# Patient Record
Sex: FEMALE | Race: OTHER | HISPANIC OR LATINO
[De-identification: names, ages, dates, MRNs, and addresses within clinical notes are randomized per-mention and may not be internally consistent; named-entity substitution may affect disease eponyms.]

---

## 2017-04-11 ENCOUNTER — RESULT REVIEW (OUTPATIENT)
Age: 52
End: 2017-04-11

## 2017-04-11 ENCOUNTER — LABORATORY RESULT (OUTPATIENT)
Age: 52
End: 2017-04-11

## 2017-04-12 ENCOUNTER — APPOINTMENT (OUTPATIENT)
Dept: OBGYN | Facility: CLINIC | Age: 52
End: 2017-04-12

## 2017-04-12 VITALS
SYSTOLIC BLOOD PRESSURE: 120 MMHG | WEIGHT: 174 LBS | DIASTOLIC BLOOD PRESSURE: 80 MMHG | HEIGHT: 63 IN | BODY MASS INDEX: 30.83 KG/M2

## 2017-04-12 DIAGNOSIS — N76.2 ACUTE VULVITIS: ICD-10-CM

## 2018-04-24 ENCOUNTER — LABORATORY RESULT (OUTPATIENT)
Age: 53
End: 2018-04-24

## 2018-04-25 ENCOUNTER — APPOINTMENT (OUTPATIENT)
Dept: OBGYN | Facility: CLINIC | Age: 53
End: 2018-04-25
Payer: MEDICAID

## 2018-04-25 VITALS
WEIGHT: 173 LBS | DIASTOLIC BLOOD PRESSURE: 80 MMHG | BODY MASS INDEX: 30.65 KG/M2 | HEIGHT: 63 IN | SYSTOLIC BLOOD PRESSURE: 120 MMHG

## 2018-04-25 PROCEDURE — 99396 PREV VISIT EST AGE 40-64: CPT

## 2019-05-13 ENCOUNTER — APPOINTMENT (OUTPATIENT)
Dept: OBGYN | Facility: CLINIC | Age: 54
End: 2019-05-13
Payer: MEDICAID

## 2019-05-13 VITALS — BODY MASS INDEX: 31.89 KG/M2 | WEIGHT: 180 LBS | DIASTOLIC BLOOD PRESSURE: 70 MMHG | SYSTOLIC BLOOD PRESSURE: 120 MMHG

## 2019-05-13 PROCEDURE — 99396 PREV VISIT EST AGE 40-64: CPT

## 2019-05-13 NOTE — COUNSELING
[Breast Self Exam] : breast self exam [Nutrition] : nutrition [Exercise] : exercise [Vitamins/Supplements] : vitamins/supplements [Vaccines] : vaccines [STD (testing, results, tx)] : STD (testing, results, tx) [Weight Management] : weight management

## 2019-05-13 NOTE — PHYSICAL EXAM
[Awake] : awake [Alert] : alert [Soft] : soft [Oriented x3] : oriented to person, place, and time [Normal] : vagina [No Bleeding] : there was no active vaginal bleeding [Absent] : absent [Uterine Adnexae] : were not tender and not enlarged [Acute Distress] : no acute distress [Mass] : no breast mass [Nipple Discharge] : no nipple discharge [Axillary LAD] : no axillary lymphadenopathy [Tender] : non tender

## 2019-11-06 ENCOUNTER — APPOINTMENT (OUTPATIENT)
Dept: OBGYN | Facility: CLINIC | Age: 54
End: 2019-11-06
Payer: MEDICAID

## 2019-11-06 VITALS — DIASTOLIC BLOOD PRESSURE: 84 MMHG | WEIGHT: 190 LBS | BODY MASS INDEX: 33.66 KG/M2 | SYSTOLIC BLOOD PRESSURE: 124 MMHG

## 2019-11-06 PROCEDURE — 99212 OFFICE O/P EST SF 10 MIN: CPT

## 2020-08-30 ENCOUNTER — LABORATORY RESULT (OUTPATIENT)
Age: 55
End: 2020-08-30

## 2020-08-31 ENCOUNTER — LABORATORY RESULT (OUTPATIENT)
Age: 55
End: 2020-08-31

## 2020-08-31 ENCOUNTER — APPOINTMENT (OUTPATIENT)
Dept: OBGYN | Facility: CLINIC | Age: 55
End: 2020-08-31
Payer: MEDICAID

## 2020-08-31 VITALS — DIASTOLIC BLOOD PRESSURE: 100 MMHG | SYSTOLIC BLOOD PRESSURE: 140 MMHG | WEIGHT: 178 LBS | BODY MASS INDEX: 31.53 KG/M2

## 2020-08-31 PROCEDURE — 99396 PREV VISIT EST AGE 40-64: CPT

## 2020-09-23 ENCOUNTER — APPOINTMENT (OUTPATIENT)
Dept: OBGYN | Facility: CLINIC | Age: 55
End: 2020-09-23
Payer: MEDICAID

## 2020-09-23 PROCEDURE — 51784 ANAL/URINARY MUSCLE STUDY: CPT

## 2020-09-23 PROCEDURE — 51741 ELECTRO-UROFLOWMETRY FIRST: CPT

## 2020-09-23 PROCEDURE — 51729 CYSTOMETROGRAM W/VP&UP: CPT

## 2020-10-08 ENCOUNTER — LABORATORY RESULT (OUTPATIENT)
Age: 55
End: 2020-10-08

## 2020-10-08 ENCOUNTER — APPOINTMENT (OUTPATIENT)
Dept: OBGYN | Facility: CLINIC | Age: 55
End: 2020-10-08
Payer: MEDICAID

## 2020-10-08 VITALS — BODY MASS INDEX: 30.29 KG/M2 | DIASTOLIC BLOOD PRESSURE: 72 MMHG | SYSTOLIC BLOOD PRESSURE: 136 MMHG | WEIGHT: 171 LBS

## 2020-10-08 DIAGNOSIS — A74.9 CHLAMYDIAL INFECTION, UNSPECIFIED: ICD-10-CM

## 2020-10-08 DIAGNOSIS — N39.46 MIXED INCONTINENCE: ICD-10-CM

## 2020-10-08 PROCEDURE — 99213 OFFICE O/P EST LOW 20 MIN: CPT

## 2020-10-08 RX ORDER — TOLTERODINE TARTRATE 4 MG/1
4 CAPSULE, EXTENDED RELEASE ORAL
Qty: 1 | Refills: 6 | Status: ACTIVE | COMMUNITY
Start: 2020-10-08 | End: 1900-01-01

## 2020-10-08 NOTE — PHYSICAL EXAM
[Labia Majora] : normal [Labia Minora] : normal [Normal] : normal [Absent] : absent [Uterine Adnexae] : normal

## 2020-10-08 NOTE — COUNSELING
[Nutrition/ Exercise/ Weight Management] : nutrition, exercise, weight management [Body Image] : body image [Vitamins/Supplements] : vitamins/supplements [Contraception/ Emergency Contraception/ Safe Sexual Practices] : contraception, emergency contraception, safe sexual practices [Preconception Care/ Fertility options] : preconception care, fertility options [STD (testing, results, tx)] : STD (testing, results, tx) [Influenza Vaccine] : influenza vaccine

## 2021-08-31 ENCOUNTER — LABORATORY RESULT (OUTPATIENT)
Age: 56
End: 2021-08-31

## 2021-09-01 ENCOUNTER — APPOINTMENT (OUTPATIENT)
Dept: OBGYN | Facility: CLINIC | Age: 56
End: 2021-09-01
Payer: MEDICAID

## 2021-09-01 VITALS — SYSTOLIC BLOOD PRESSURE: 128 MMHG | DIASTOLIC BLOOD PRESSURE: 82 MMHG | BODY MASS INDEX: 31.53 KG/M2 | WEIGHT: 178 LBS

## 2021-09-01 PROCEDURE — 99396 PREV VISIT EST AGE 40-64: CPT

## 2021-09-01 RX ORDER — CLOTRIMAZOLE AND BETAMETHASONE DIPROPIONATE 10; .5 MG/G; MG/G
1-0.05 CREAM TOPICAL TWICE DAILY
Qty: 1 | Refills: 0 | Status: COMPLETED | COMMUNITY
Start: 2017-04-12 | End: 2021-09-01

## 2021-09-01 RX ORDER — AZITHROMYCIN 1 G/1
1 POWDER, FOR SUSPENSION ORAL
Qty: 1 | Refills: 1 | Status: COMPLETED | COMMUNITY
Start: 2020-09-04 | End: 2021-09-01

## 2021-09-01 NOTE — HISTORY OF PRESENT ILLNESS
[FreeTextEntry1] : Patient is a 56 year old female who presents for her annual exam.  Reports no vaginal bleeding, no abdominal or pelvic pain, change in discharge, change in bowel or bladder habits or any other concerns.  Due for pap and mammo.

## 2021-09-01 NOTE — PHYSICAL EXAM
[Appropriately responsive] : appropriately responsive [Alert] : alert [No Acute Distress] : no acute distress [No Lymphadenopathy] : no lymphadenopathy [Regular Rate Rhythm] : regular rate rhythm [No Murmurs] : no murmurs [Clear to Auscultation B/L] : clear to auscultation bilaterally [Soft] : soft [Non-tender] : non-tender [Non-distended] : non-distended [No HSM] : No HSM [No Lesions] : no lesions [No Mass] : no mass [Oriented x3] : oriented x3 [Examination Of The Breasts] : a normal appearance [No Masses] : no breast masses were palpable [Labia Minora] : normal [Labia Majora] : normal [Normal] : normal [Absent] : absent [Uterine Adnexae] : normal

## 2022-09-07 ENCOUNTER — RESULT REVIEW (OUTPATIENT)
Age: 57
End: 2022-09-07

## 2022-09-21 ENCOUNTER — APPOINTMENT (OUTPATIENT)
Dept: OBGYN | Facility: CLINIC | Age: 57
End: 2022-09-21

## 2022-09-21 VITALS
WEIGHT: 161 LBS | HEART RATE: 65 BPM | OXYGEN SATURATION: 100 % | SYSTOLIC BLOOD PRESSURE: 125 MMHG | DIASTOLIC BLOOD PRESSURE: 83 MMHG | BODY MASS INDEX: 28.52 KG/M2

## 2022-09-21 PROCEDURE — 99396 PREV VISIT EST AGE 40-64: CPT

## 2022-09-21 NOTE — HISTORY OF PRESENT ILLNESS
[FreeTextEntry1] : Patient is a 57 year old female who presents for her annual exam.  Reports no vaginal bleeding, no abdominal or pelvic pain, change in discharge, change in bowel or bladder habits or any other concerns.  Due for pap and mammo scheduled for december.  Not sexually active.

## 2022-10-05 LAB
C TRACH RRNA SPEC QL NAA+PROBE: NOT DETECTED
CYTOLOGY CVX/VAG DOC THIN PREP: ABNORMAL
N GONORRHOEA RRNA SPEC QL NAA+PROBE: NOT DETECTED
SOURCE TP AMPLIFICATION: NORMAL

## 2023-01-25 ENCOUNTER — APPOINTMENT (OUTPATIENT)
Dept: OBGYN | Facility: CLINIC | Age: 58
End: 2023-01-25
Payer: MEDICAID

## 2023-01-25 VITALS
WEIGHT: 174 LBS | DIASTOLIC BLOOD PRESSURE: 79 MMHG | BODY MASS INDEX: 30.82 KG/M2 | OXYGEN SATURATION: 98 % | SYSTOLIC BLOOD PRESSURE: 134 MMHG | HEART RATE: 64 BPM

## 2023-01-25 DIAGNOSIS — B00.9 HERPESVIRAL INFECTION, UNSPECIFIED: ICD-10-CM

## 2023-01-25 PROCEDURE — 99212 OFFICE O/P EST SF 10 MIN: CPT

## 2023-01-25 RX ORDER — VALACYCLOVIR 500 MG/1
500 TABLET, FILM COATED ORAL TWICE DAILY
Qty: 10 | Refills: 2 | Status: ACTIVE | COMMUNITY
Start: 2023-01-25 | End: 1900-01-01

## 2023-01-25 NOTE — PHYSICAL EXAM
[Appropriately responsive] : appropriately responsive [Alert] : alert [No Acute Distress] : no acute distress [No Lymphadenopathy] : no lymphadenopathy [Regular Rate Rhythm] : regular rate rhythm [No Murmurs] : no murmurs [Clear to Auscultation B/L] : clear to auscultation bilaterally [Soft] : soft [Non-tender] : non-tender [Non-distended] : non-distended [No HSM] : No HSM [No Lesions] : no lesions [No Mass] : no mass [Oriented x3] : oriented x3 [Labia Majora] : normal [Labia Minora] : normal [Normal] : normal [Uterine Adnexae] : normal [FreeTextEntry2] : + scar from prev herpetic outbreak

## 2023-01-25 NOTE — HISTORY OF PRESENT ILLNESS
[FreeTextEntry1] : Patient is a 57 year old female who presents for follow up .  States that she traveled to her country over the [place] and had a painful lesion on her right labia.  Was told that it was herpes and given medication.  No current pain, discharge, bleeding, or any other concerns.   States that she wants to make sure that there are no current lesions.  + scar noted where previous lesion was, non painful, not indurated. Up to date on pap.

## 2023-09-27 ENCOUNTER — APPOINTMENT (OUTPATIENT)
Dept: OBGYN | Facility: CLINIC | Age: 58
End: 2023-09-27
Payer: MEDICAID

## 2023-09-27 VITALS
WEIGHT: 180 LBS | DIASTOLIC BLOOD PRESSURE: 83 MMHG | OXYGEN SATURATION: 99 % | SYSTOLIC BLOOD PRESSURE: 129 MMHG | HEART RATE: 72 BPM | BODY MASS INDEX: 31.89 KG/M2

## 2023-09-27 DIAGNOSIS — Z01.419 ENCOUNTER FOR GYNECOLOGICAL EXAMINATION (GENERAL) (ROUTINE) W/OUT ABNORMAL FINDINGS: ICD-10-CM

## 2023-09-27 PROCEDURE — 99396 PREV VISIT EST AGE 40-64: CPT

## 2023-09-28 LAB
C TRACH RRNA SPEC QL NAA+PROBE: NOT DETECTED
N GONORRHOEA RRNA SPEC QL NAA+PROBE: NOT DETECTED
SOURCE TP AMPLIFICATION: NORMAL

## 2023-10-04 LAB — CYTOLOGY CVX/VAG DOC THIN PREP: ABNORMAL

## 2024-06-01 NOTE — PHYSICAL EXAM
"Subjective     History provided by:  Patient and medical records    History of Present Illness    Chief complaint: Nausea and vomiting    Location: Upper GI tract    Quality/Severity: Patient says severe nausea has vomited numerous times.  Not tolerating p.o.    Timing/Onset: Symptoms started last night.    Modifying Factors: Patient has a history of diabetes with ketoacidosis, history of GERD and history of Howell's esophagus.  She also has a history of polysubstance abuse.    Associated symptoms: Denies a fever.  States her abdomen only hurts when she vomits.  States she has had congestion and a nonproductive cough for 4 to 5 days.    Narrative: The patient is a 58-year-old white female who states she developed congestion and a cough about 4 to 5 days ago.  Last night she developed severe nausea and has vomited numerous times.  The patient has been hospitalized here before for ketoacidosis.  She is not tolerating p.o.  She states that her abdomen only hurts when she vomits.    Review of Systems  Past Medical History:   Diagnosis Date    Abnormal Pap smear of cervix     1 abnormal pap with normal f/u    Howell esophagus     Colon polyp     Diabetes mellitus     Difficulty swallowing     at times    Fibroid     GERD (gastroesophageal reflux disease)     HSV-2 (herpes simplex virus 2) infection 7/2/2017    Menstrual disorder     Migraines     Rectal bleeding     bleeds with bowel movement    Seasonal allergies      /65   Pulse (!) 121   Temp 97.1 °F (36.2 °C) (Rectal) Comment: Changed Gaymar to monitor mode at this time  Resp 26   Ht 160 cm (63\")   Wt 56.5 kg (124 lb 9 oz)   LMP  (LMP Unknown)   SpO2 98%   BMI 22.06 kg/m²     Past Medical History:   Diagnosis Date    Abnormal Pap smear of cervix     1 abnormal pap with normal f/u    Howell esophagus     Colon polyp     Diabetes mellitus     Difficulty swallowing     at times    Fibroid     GERD (gastroesophageal reflux disease)     HSV-2 (herpes " simplex virus 2) infection 2017    Menstrual disorder     Migraines     Rectal bleeding     bleeds with bowel movement    Seasonal allergies        Allergies   Allergen Reactions    Mobic [Meloxicam]      Blurred vision       Past Surgical History:   Procedure Laterality Date    CARPAL TUNNEL RELEASE WITH CUBITAL TUNNEL RELEASE Right      SECTION      X2    COLONOSCOPY N/A 2016    Procedure: COLONOSCOPY with polypectomy;  Surgeon: Maryan Kent MD;  Location: [facility] OR;  Service:     COLONOSCOPY N/A 6/3/2020    Procedure: COLONOSCOPY;  Surgeon: Dmitri Fung MD;  Location: [facility] OR;  Service: Gastroenterology;  Laterality: N/A;  Poor prep, polyp    ENDOMETRIAL ABLATION      thermachoice    ENDOSCOPY N/A 6/3/2020    Procedure: ESOPHAGOGASTRODUODENOSCOPY;  Surgeon: Dmitri Fung MD;  Location: [facility] OR;  Service: Gastroenterology;  Laterality: N/A;  Reflux esophagitis, erosive gastritis, duodenitis    HYSTEROSCOPY      AND ENDOMETRIAL  ABLATION    NASAL SEPTUM SURGERY          TUBAL ABDOMINAL LIGATION         Family History   Problem Relation Age of Onset    Heart attack Father     Hypertension Father     Diabetes Father     Alcohol abuse Father     Dementia Father     Hypertension Mother     Hyperlipidemia Mother     Atrial fibrillation Mother     Stroke Mother     Heart attack Brother     Hypertension Brother     Stroke Brother     Esophageal cancer Brother     Stroke Sister     Crohn's disease Sister     Alzheimer's disease Paternal Grandmother     Colon cancer Maternal Uncle     Diabetes Paternal Aunt     Breast cancer Paternal Aunt     Rheum arthritis Other     Ovarian cancer Neg Hx     Deep vein thrombosis Neg Hx     Pulmonary embolism Neg Hx     Uterine cancer Neg Hx        Social History     Socioeconomic History    Marital status:    Tobacco Use    Smoking status: Former     Current packs/day: 0.00     Types: Cigarettes     Quit date: 1995  "    Years since quittin.0    Smokeless tobacco: Never    Tobacco comments:     Quit \" a long time ago\"    Vaping Use    Vaping status: Never Used   Substance and Sexual Activity    Alcohol use: Yes     Comment: OCCASIONALLY    Drug use: Yes     Types: Marijuana    Sexual activity: Yes     Partners: Male     Birth control/protection: Post-menopausal, Tubal ligation     Comment: BTL           Objective   Physical Exam  Vitals and nursing note reviewed.   Constitutional:       General: She is in acute distress.      Appearance: She is ill-appearing. She is not toxic-appearing or diaphoretic.      Comments: The patient appears much older than her stated age.  She appears in discomfort secondary to nausea.  Review of her vital signs: She is afebrile with a temperature 96.8, respirations normal 16 with a normal room air oxygen saturation 1%, tachycardic with heart rate of 107, blood pressure normal 117/49.   HENT:      Head: Normocephalic and atraumatic.      Nose: Nose normal.      Mouth/Throat:      Mouth: Mucous membranes are moist.      Pharynx: Oropharynx is clear.   Eyes:      General: No scleral icterus.     Conjunctiva/sclera: Conjunctivae normal.   Cardiovascular:      Rate and Rhythm: Regular rhythm. Tachycardia present.      Heart sounds: No murmur heard.  Pulmonary:      Effort: Pulmonary effort is normal.      Breath sounds: Normal breath sounds. No wheezing or rhonchi.   Abdominal:      Tenderness: There is abdominal tenderness (Subjective). There is no right CVA tenderness, left CVA tenderness, guarding or rebound.   Musculoskeletal:         General: No deformity or signs of injury.      Cervical back: Normal range of motion and neck supple. No tenderness.   Lymphadenopathy:      Cervical: No cervical adenopathy.   Skin:     General: Skin is warm and dry.   Neurological:      Mental Status: She is alert.   Psychiatric:      Comments: Mood and affect consistent with not feeling well. "         Procedures           ED Course  ED Course as of 06/01/24 1445   Sat Jun 01, 2024   1026 Review of the patient's laboratory studies: Her COVID and influenza PCR's are negative.  CMP has a markedly elevated glucose of 735 with a low sodium of 131 and an elevated potassium of 5.9.  Her BUN is elevated at 27 with creatinine elevated to 1.54 with a low GFR 39 consistent with possible acute kidney injury.  Liver enzymes essentially within normal limits.  Lipase normal.  Serum acetone is positive.  Her CO2 was very low at 2.9 consistent with acidosis.  CBC has an elevated white count of 23 with a normal differential.  Hemoglobin slightly low at 10.6 with normal hematocrit 36.2.  Urine tox cream was positive for only THC. [TP]   1027 The patient was initially administered a liter normal saline IV bolus.  For nausea and vomiting she was administered Zofran 8 mg IV and Protonix 40 mg IV. [TP]   1027 Once the patient's CMP came back with a markedly elevated blood glucose of 735 with an elevated potassium of 5.9, regular insulin 12 units IV was ordered. [TP]   1028 10: 25 patient discussed with Dr. Ma, who will admit the patient to the intensive care unit. [TP]   1111 The patient's venous gas came back with a pH of 6.8.  The patient will be administered an amp of bicarb for both her acidosis and her hyperkalemia.  Discussed this with Dr. Sharma. [TP]      ED Course User Index  [TP] Raffy Rocha MD                                             Medical Decision Making  Problems Addressed:  Acute kidney injury: complicated acute illness or injury  Diabetic ketoacidosis without coma associated with type 2 diabetes mellitus: complicated acute illness or injury  Hyperkalemia: complicated acute illness or injury    Amount and/or Complexity of Data Reviewed  Labs: ordered. Decision-making details documented in ED Course.  Discussion of management or test interpretation with external provider(s): Details documented in  the ED course.    Risk  OTC drugs.  Prescription drug management.  Decision regarding hospitalization.        Final diagnoses:   Diabetic ketoacidosis without coma associated with type 2 diabetes mellitus   Hyperkalemia   Acute kidney injury       ED Disposition  ED Disposition       ED Disposition   Decision to Admit    Condition   --    Comment   Level of Care: Critical Care [6]   Diagnosis: DKA (diabetic ketoacidosis) [379837]   Admitting Physician: REI VASQUEZ [[id number]]                 No follow-up provider specified.       Medication List      No changes were made to your prescriptions during this visit.           Labs Reviewed   COMPREHENSIVE METABOLIC PANEL - Abnormal; Notable for the following components:       Result Value    Glucose 735 (*)     BUN 27 (*)     Creatinine 1.54 (*)     Sodium 131 (*)     Potassium 5.9 (*)     Chloride 93 (*)     CO2 2.9 (*)     Alkaline Phosphatase 125 (*)     Anion Gap 35.1 (*)     eGFR 39.0 (*)     All other components within normal limits    Narrative:     GFR Normal >60  Chronic Kidney Disease <60  Kidney Failure <15     URINALYSIS W/ MICROSCOPIC IF INDICATED (NO CULTURE) - Abnormal; Notable for the following components:    Glucose,  mg/dL (2+) (*)     Ketones, UA >=160 mg/dL (4+) (*)     Bilirubin, UA Small (1+) (*)     Blood, UA Small (1+) (*)     Protein,  mg/dL (2+) (*)     All other components within normal limits   ACETONE - Abnormal; Notable for the following components:    Acetone Small (*)     All other components within normal limits   URINE DRUG SCREEN - Abnormal; Notable for the following components:    THC, Screen, Urine Positive (*)     All other components within normal limits    Narrative:     Cutoff For Drugs Screened:    Amphetamines               500 ng/ml  Barbiturates               200 ng/ml  Benzodiazepines            150 ng/ml  Cocaine                    150 ng/ml  Methadone                  200 ng/ml  Opiates                     100 ng/ml  Phencyclidine               25 ng/ml  THC                         50 ng/ml  Methamphetamine            500 ng/ml  Tricyclic Antidepressants  300 ng/ml  Oxycodone                  100 ng/ml  Buprenorphine               10 ng/ml    The normal value for all drugs tested is negative. This report includes unconfirmed screening results, with the cutoff values listed, to be used for medical treatment purposes only.  Unconfirmed results must not be used for non-medical purposes such as employment or legal testing.  Clinical consideration should be applied to any drug of abuse test, particularly when unconfirmed results are used.     CBC WITH AUTO DIFFERENTIAL - Abnormal; Notable for the following components:    WBC 23.01 (*)     Hemoglobin 10.6 (*)     MCHC 29.3 (*)     RDW 17.2 (*)     RDW-SD 57.8 (*)     Lymphocyte % 3.9 (*)     Eosinophil % 36.4 (*)     Immature Grans % 1.0 (*)     Neutrophils, Absolute 11.89 (*)     Monocytes, Absolute 1.56 (*)     Eosinophils, Absolute 8.38 (*)     Immature Grans, Absolute 0.23 (*)     All other components within normal limits   URINALYSIS, MICROSCOPIC ONLY - Abnormal; Notable for the following components:    Bacteria, UA Trace (*)     All other components within normal limits   MANUAL DIFFERENTIAL - Abnormal; Notable for the following components:    Neutrophil % 91.0 (*)     Lymphocyte % 8.0 (*)     Monocyte % 1.0 (*)     Neutrophils Absolute 20.94 (*)     All other components within normal limits   PHOSPHORUS - Abnormal; Notable for the following components:    Phosphorus 7.5 (*)     All other components within normal limits   HEMOGLOBIN A1C - Abnormal; Notable for the following components:    Hemoglobin A1C 8.16 (*)     All other components within normal limits    Narrative:     Hemoglobin A1C Ranges:    Increased Risk for Diabetes  5.7% to 6.4%  Diabetes                     >= 6.5%  Diabetic Goal                < 7.0%   TROPONIN - Abnormal; Notable for the following  components:    HS Troponin T 23 (*)     All other components within normal limits    Narrative:     High Sensitive Troponin T Reference Range:  <14.0 ng/L- Negative Female for AMI  <22.0 ng/L- Negative Male for AMI  >=14 - Abnormal Female indicating possible myocardial injury.  >=22 - Abnormal Male indicating possible myocardial injury.   Clinicians would have to utilize clinical acumen, EKG, Troponin, and serial changes to determine if it is an Acute Myocardial Infarction or myocardial injury due to an underlying chronic condition.        BASIC METABOLIC PANEL - Abnormal; Notable for the following components:    Glucose 668 (*)     BUN 28 (*)     Creatinine 1.41 (*)     CO2 3.8 (*)     Anion Gap 33.2 (*)     eGFR 43.3 (*)     All other components within normal limits    Narrative:     GFR Normal >60  Chronic Kidney Disease <60  Kidney Failure <15     PHOSPHORUS - Abnormal; Notable for the following components:    Phosphorus 6.2 (*)     All other components within normal limits   HIGH SENSITIVITIY TROPONIN T 2HR - Abnormal; Notable for the following components:    HS Troponin T 27 (*)     Troponin T Delta 4 (*)     All other components within normal limits    Narrative:     High Sensitive Troponin T Reference Range:  <14.0 ng/L- Negative Female for AMI  <22.0 ng/L- Negative Male for AMI  >=14 - Abnormal Female indicating possible myocardial injury.  >=22 - Abnormal Male indicating possible myocardial injury.   Clinicians would have to utilize clinical acumen, EKG, Troponin, and serial changes to determine if it is an Acute Myocardial Infarction or myocardial injury due to an underlying chronic condition.        POCT GLUCOSE FINGERSTICK - Abnormal; Notable for the following components:    Glucose 522 (*)     All other components within normal limits   POCT GLUCOSE FINGERSTICK - Abnormal; Notable for the following components:    Glucose 486 (*)     All other components within normal limits   COVID-19 AND FLU A/B, NP  SWAB IN TRANSPORT MEDIA 1 HR TAT - Normal    Narrative:     Fact sheet for providers: https://www.fda.gov/media/216282/download    Fact sheet for patients: https://www.fda.gov/media/061069/download    Test performed by PCR.   LIPASE - Normal   MAGNESIUM - Normal   MAGNESIUM - Normal   RAINBOW DRAW    Narrative:     The following orders were created for panel order [place] Draw.  Procedure                               Abnormality         Status                     ---------                               -----------         ------                     Green Top (Gel)[[id number]]                                  Final result               Lavender Top[[id number]]                                     Final result               Gold Top - SST[[id number]]                                   Final result               Light Blue Top[[id number]]                                   Final result                 Please view results for these tests on the individual orders.   BLOOD GAS, VENOUS   BLOOD GAS, VENOUS   OSMOLALITY, SERUM   BASIC METABOLIC PANEL   MAGNESIUM   PHOSPHORUS   BASIC METABOLIC PANEL   MAGNESIUM   PHOSPHORUS   GREEN TOP   LAVENDER TOP   GOLD TOP - SST   LIGHT BLUE TOP   CBC AND DIFFERENTIAL    Narrative:     The following orders were created for panel order CBC & Differential.  Procedure                               Abnormality         Status                     ---------                               -----------         ------                     CBC Auto Differential[[id number]]        Abnormal            Final result               Scan Slide[[id number]]                                                                    Please view results for these tests on the individual orders.     No orders to display          Medication List      No changes were made to your prescriptions during this visit.              Raffy Rocha MD  06/01/24 [id number]     [Awake] : awake [Alert] : alert [Soft] : soft [Oriented x3] : oriented to person, place, and time [Normal] : vagina [No Bleeding] : there was no active vaginal bleeding [Absent] : absent [Uterine Adnexae] : were not tender and not enlarged [RRR, No Murmurs] : RRR, no murmurs [CTAB] : CTAB [Acute Distress] : no acute distress [LAD] : no lymphadenopathy [Thyroid Nodule] : no thyroid nodule [Goiter] : no goiter [Mass] : no breast mass [Nipple Discharge] : no nipple discharge [Axillary LAD] : no axillary lymphadenopathy [Tender] : non tender [Distended] : not distended [H/Smegaly] : no hepatosplenomegaly [Depressed Mood] : not depressed [Flat Affect] : affect not flat

## 2024-10-02 ENCOUNTER — NON-APPOINTMENT (OUTPATIENT)
Age: 59
End: 2024-10-02

## 2024-10-03 ENCOUNTER — APPOINTMENT (OUTPATIENT)
Dept: OBGYN | Facility: CLINIC | Age: 59
End: 2024-10-03
Payer: MEDICAID

## 2024-10-03 ENCOUNTER — LABORATORY RESULT (OUTPATIENT)
Age: 59
End: 2024-10-03

## 2024-10-03 VITALS
HEART RATE: 85 BPM | SYSTOLIC BLOOD PRESSURE: 143 MMHG | WEIGHT: 173 LBS | DIASTOLIC BLOOD PRESSURE: 91 MMHG | OXYGEN SATURATION: 98 % | BODY MASS INDEX: 30.65 KG/M2

## 2024-10-03 DIAGNOSIS — Z01.419 ENCOUNTER FOR GYNECOLOGICAL EXAMINATION (GENERAL) (ROUTINE) W/OUT ABNORMAL FINDINGS: ICD-10-CM

## 2024-10-03 PROCEDURE — 99386 PREV VISIT NEW AGE 40-64: CPT

## 2024-10-04 NOTE — HISTORY OF PRESENT ILLNESS
[FreeTextEntry1] : Pt comes for annual exam . She has no complains. needs mammogram , pap and bone density .